# Patient Record
Sex: FEMALE | Race: WHITE | NOT HISPANIC OR LATINO | Employment: UNEMPLOYED | URBAN - NONMETROPOLITAN AREA
[De-identification: names, ages, dates, MRNs, and addresses within clinical notes are randomized per-mention and may not be internally consistent; named-entity substitution may affect disease eponyms.]

---

## 2023-03-20 ENCOUNTER — TELEPHONE (OUTPATIENT)
Dept: PRIMARY CARE | Facility: CLINIC | Age: 33
End: 2023-03-20

## 2023-03-20 DIAGNOSIS — E10.9 TYPE 1 DIABETES MELLITUS WITHOUT COMPLICATION (MULTI): ICD-10-CM

## 2023-03-20 RX ORDER — INSULIN GLARGINE 100 [IU]/ML
28 INJECTION, SOLUTION SUBCUTANEOUS 2 TIMES DAILY
Qty: 30 ML | Refills: 3 | Status: SHIPPED | OUTPATIENT
Start: 2023-03-20 | End: 2023-07-10 | Stop reason: SDUPTHER

## 2023-03-20 RX ORDER — INSULIN GLARGINE 100 [IU]/ML
28 INJECTION, SOLUTION SUBCUTANEOUS 2 TIMES DAILY
COMMUNITY
End: 2023-03-20 | Stop reason: SDUPTHER

## 2023-03-20 NOTE — TELEPHONE ENCOUNTER
Insurance messed up patient's insulin dosage. Patient is leaving for the weekend so needs it corrected. Asking for a call back.

## 2023-05-11 ENCOUNTER — TELEPHONE (OUTPATIENT)
Dept: PRIMARY CARE | Facility: CLINIC | Age: 33
End: 2023-05-11
Payer: MEDICAID

## 2023-05-11 DIAGNOSIS — K21.9 GASTROESOPHAGEAL REFLUX DISEASE WITHOUT ESOPHAGITIS: Primary | ICD-10-CM

## 2023-05-11 RX ORDER — PANTOPRAZOLE SODIUM 40 MG/1
40 TABLET, DELAYED RELEASE ORAL DAILY
Qty: 30 TABLET | Refills: 11 | Status: SHIPPED | OUTPATIENT
Start: 2023-05-11 | End: 2024-05-10

## 2023-05-11 NOTE — TELEPHONE ENCOUNTER
Was taking medication for acid reflux, but it was giving her constipation. She stopped taking it for 2 days and was better. Asking if she should continue taking it. Asking for a call back.

## 2023-07-06 ENCOUNTER — TELEPHONE (OUTPATIENT)
Dept: PRIMARY CARE | Facility: CLINIC | Age: 33
End: 2023-07-06
Payer: MEDICAID

## 2023-07-06 DIAGNOSIS — J30.89 PERENNIAL ALLERGIC RHINITIS: ICD-10-CM

## 2023-07-06 DIAGNOSIS — J45.909 EXTRINSIC ASTHMA, UNSPECIFIED ASTHMA SEVERITY, UNSPECIFIED WHETHER COMPLICATED, UNSPECIFIED WHETHER PERSISTENT (HHS-HCC): ICD-10-CM

## 2023-07-06 PROBLEM — M25.562 LEFT KNEE PAIN: Status: ACTIVE | Noted: 2023-07-06

## 2023-07-06 PROBLEM — K03.6 DENTAL PLAQUE: Status: ACTIVE | Noted: 2023-07-06

## 2023-07-06 PROBLEM — M24.462: Status: ACTIVE | Noted: 2023-07-06

## 2023-07-06 PROBLEM — M72.2 PLANTAR FASCIITIS: Status: ACTIVE | Noted: 2023-07-06

## 2023-07-06 PROBLEM — I10 HTN (HYPERTENSION): Status: ACTIVE | Noted: 2023-07-06

## 2023-07-06 PROBLEM — E04.1 THYROID NODULE: Status: ACTIVE | Noted: 2023-07-06

## 2023-07-06 PROBLEM — M25.531 RIGHT WRIST PAIN: Status: ACTIVE | Noted: 2023-07-06

## 2023-07-06 PROBLEM — M22.2X2 PATELLOFEMORAL PAIN SYNDROME OF LEFT KNEE: Status: ACTIVE | Noted: 2023-07-06

## 2023-07-06 PROBLEM — S80.02XA CONTUSION OF LEFT KNEE: Status: ACTIVE | Noted: 2023-07-06

## 2023-07-06 PROBLEM — J34.89 SINUS DRAINAGE: Status: ACTIVE | Noted: 2023-07-06

## 2023-07-06 PROBLEM — M22.42 CHONDROMALACIA OF LEFT PATELLA: Status: ACTIVE | Noted: 2023-07-06

## 2023-07-06 PROBLEM — E10.65 TYPE 1 DIABETES MELLITUS WITH HYPERGLYCEMIA (MULTI): Status: ACTIVE | Noted: 2023-07-06

## 2023-07-06 RX ORDER — MONTELUKAST SODIUM 10 MG/1
1 TABLET, FILM COATED ORAL DAILY
COMMUNITY
Start: 2020-03-17 | End: 2023-07-06 | Stop reason: SDUPTHER

## 2023-07-06 RX ORDER — MONTELUKAST SODIUM 10 MG/1
10 TABLET, FILM COATED ORAL DAILY
Qty: 30 TABLET | Refills: 11 | Status: SHIPPED | OUTPATIENT
Start: 2023-07-06 | End: 2023-07-11

## 2023-07-06 NOTE — TELEPHONE ENCOUNTER
Stating the RX of singulair has 10 refills left, but the pharmacy says she needs a doctor approval to refill it. Asking for a call back.

## 2023-07-08 LAB
ALANINE AMINOTRANSFERASE (SGPT) (U/L) IN SER/PLAS: 10 U/L (ref 7–45)
ALBUMIN (G/DL) IN SER/PLAS: 3.6 G/DL (ref 3.4–5)
ALKALINE PHOSPHATASE (U/L) IN SER/PLAS: 75 U/L (ref 33–110)
ANION GAP IN SER/PLAS: 11 MMOL/L (ref 10–20)
ASPARTATE AMINOTRANSFERASE (SGOT) (U/L) IN SER/PLAS: 9 U/L (ref 9–39)
BASOPHILS (10*3/UL) IN BLOOD BY AUTOMATED COUNT: 0.04 X10E9/L (ref 0–0.1)
BASOPHILS/100 LEUKOCYTES IN BLOOD BY AUTOMATED COUNT: 0.5 % (ref 0–2)
BILIRUBIN TOTAL (MG/DL) IN SER/PLAS: 0.6 MG/DL (ref 0–1.2)
CALCIUM (MG/DL) IN SER/PLAS: 8.8 MG/DL (ref 8.6–10.3)
CARBON DIOXIDE, TOTAL (MMOL/L) IN SER/PLAS: 28 MMOL/L (ref 21–32)
CHLORIDE (MMOL/L) IN SER/PLAS: 103 MMOL/L (ref 98–107)
CREATININE (MG/DL) IN SER/PLAS: 0.7 MG/DL (ref 0.5–1.05)
EOSINOPHILS (10*3/UL) IN BLOOD BY AUTOMATED COUNT: 0.27 X10E9/L (ref 0–0.7)
EOSINOPHILS/100 LEUKOCYTES IN BLOOD BY AUTOMATED COUNT: 3.1 % (ref 0–6)
ERYTHROCYTE DISTRIBUTION WIDTH (RATIO) BY AUTOMATED COUNT: 13.5 % (ref 11.5–14.5)
ERYTHROCYTE MEAN CORPUSCULAR HEMOGLOBIN CONCENTRATION (G/DL) BY AUTOMATED: 32.3 G/DL (ref 32–36)
ERYTHROCYTE MEAN CORPUSCULAR VOLUME (FL) BY AUTOMATED COUNT: 84 FL (ref 80–100)
ERYTHROCYTES (10*6/UL) IN BLOOD BY AUTOMATED COUNT: 5.22 X10E12/L (ref 4–5.2)
ESTIMATED AVERAGE GLUCOSE FOR HBA1C: 235 MG/DL
GFR FEMALE: >90 ML/MIN/1.73M2
GLUCOSE (MG/DL) IN SER/PLAS: 285 MG/DL (ref 74–99)
HEMATOCRIT (%) IN BLOOD BY AUTOMATED COUNT: 43.7 % (ref 36–46)
HEMOGLOBIN (G/DL) IN BLOOD: 14.1 G/DL (ref 12–16)
HEMOGLOBIN A1C/HEMOGLOBIN TOTAL IN BLOOD: 9.8 %
IMMATURE GRANULOCYTES/100 LEUKOCYTES IN BLOOD BY AUTOMATED COUNT: 0.2 % (ref 0–0.9)
LEUKOCYTES (10*3/UL) IN BLOOD BY AUTOMATED COUNT: 8.8 X10E9/L (ref 4.4–11.3)
LYMPHOCYTES (10*3/UL) IN BLOOD BY AUTOMATED COUNT: 2.54 X10E9/L (ref 1.2–4.8)
LYMPHOCYTES/100 LEUKOCYTES IN BLOOD BY AUTOMATED COUNT: 28.8 % (ref 13–44)
MONOCYTES (10*3/UL) IN BLOOD BY AUTOMATED COUNT: 0.59 X10E9/L (ref 0.1–1)
MONOCYTES/100 LEUKOCYTES IN BLOOD BY AUTOMATED COUNT: 6.7 % (ref 2–10)
NEUTROPHILS (10*3/UL) IN BLOOD BY AUTOMATED COUNT: 5.37 X10E9/L (ref 1.2–7.7)
NEUTROPHILS/100 LEUKOCYTES IN BLOOD BY AUTOMATED COUNT: 60.7 % (ref 40–80)
PLATELETS (10*3/UL) IN BLOOD AUTOMATED COUNT: 295 X10E9/L (ref 150–450)
POTASSIUM (MMOL/L) IN SER/PLAS: 4.5 MMOL/L (ref 3.5–5.3)
PROTEIN TOTAL: 6.4 G/DL (ref 6.4–8.2)
SODIUM (MMOL/L) IN SER/PLAS: 137 MMOL/L (ref 136–145)
UREA NITROGEN (MG/DL) IN SER/PLAS: 12 MG/DL (ref 6–23)

## 2023-07-10 ENCOUNTER — OFFICE VISIT (OUTPATIENT)
Dept: PRIMARY CARE | Facility: CLINIC | Age: 33
End: 2023-07-10
Payer: MEDICAID

## 2023-07-10 VITALS
WEIGHT: 184 LBS | HEART RATE: 100 BPM | SYSTOLIC BLOOD PRESSURE: 134 MMHG | HEIGHT: 66 IN | DIASTOLIC BLOOD PRESSURE: 84 MMHG | OXYGEN SATURATION: 97 % | BODY MASS INDEX: 29.57 KG/M2

## 2023-07-10 DIAGNOSIS — E10.9 TYPE 1 DIABETES MELLITUS WITHOUT COMPLICATION (MULTI): ICD-10-CM

## 2023-07-10 DIAGNOSIS — E10.65 TYPE 1 DIABETES MELLITUS WITH HYPERGLYCEMIA (MULTI): Primary | ICD-10-CM

## 2023-07-10 DIAGNOSIS — M25.562 LEFT KNEE PAIN, UNSPECIFIED CHRONICITY: ICD-10-CM

## 2023-07-10 DIAGNOSIS — M72.2 PLANTAR FASCIITIS: ICD-10-CM

## 2023-07-10 DIAGNOSIS — M24.462 RECURRENT DISLOCATION OF LEFT KNEE: ICD-10-CM

## 2023-07-10 DIAGNOSIS — M79.672 LEFT FOOT PAIN: ICD-10-CM

## 2023-07-10 DIAGNOSIS — K03.6 DENTAL PLAQUE: ICD-10-CM

## 2023-07-10 DIAGNOSIS — I10 PRIMARY HYPERTENSION: ICD-10-CM

## 2023-07-10 DIAGNOSIS — K21.9 GASTROESOPHAGEAL REFLUX DISEASE WITHOUT ESOPHAGITIS: ICD-10-CM

## 2023-07-10 DIAGNOSIS — E04.1 THYROID NODULE: ICD-10-CM

## 2023-07-10 DIAGNOSIS — J30.89 PERENNIAL ALLERGIC RHINITIS: ICD-10-CM

## 2023-07-10 DIAGNOSIS — J45.40 MODERATE PERSISTENT EXTRINSIC ASTHMA WITHOUT COMPLICATION (HHS-HCC): ICD-10-CM

## 2023-07-10 PROBLEM — L03.031 PARONYCHIA OF GREAT TOE OF RIGHT FOOT: Status: RESOLVED | Noted: 2023-07-10 | Resolved: 2023-07-10

## 2023-07-10 PROBLEM — M22.2X2 PATELLOFEMORAL PAIN SYNDROME OF LEFT KNEE: Status: RESOLVED | Noted: 2023-07-06 | Resolved: 2023-07-10

## 2023-07-10 PROBLEM — M20.42 ACQUIRED HAMMER TOE OF LEFT FOOT: Status: ACTIVE | Noted: 2023-07-10

## 2023-07-10 PROBLEM — M22.42 CHONDROMALACIA OF LEFT PATELLA: Status: RESOLVED | Noted: 2023-07-06 | Resolved: 2023-07-10

## 2023-07-10 PROBLEM — E11.40 DIABETIC NEUROPATHY (MULTI): Status: RESOLVED | Noted: 2023-07-10 | Resolved: 2023-07-10

## 2023-07-10 PROBLEM — L30.1 DYSHYDROSIS: Status: ACTIVE | Noted: 2023-07-10

## 2023-07-10 PROBLEM — S80.02XA CONTUSION OF LEFT KNEE: Status: RESOLVED | Noted: 2023-07-06 | Resolved: 2023-07-10

## 2023-07-10 PROBLEM — H57.9 EYE PROBLEM: Status: RESOLVED | Noted: 2023-07-10 | Resolved: 2023-07-10

## 2023-07-10 PROBLEM — L03.031 PARONYCHIA OF GREAT TOE OF RIGHT FOOT: Status: ACTIVE | Noted: 2023-07-10

## 2023-07-10 PROBLEM — J34.89 SINUS DRAINAGE: Status: RESOLVED | Noted: 2023-07-06 | Resolved: 2023-07-10

## 2023-07-10 PROBLEM — E11.40 DIABETIC NEUROPATHY (MULTI): Status: ACTIVE | Noted: 2023-07-10

## 2023-07-10 PROBLEM — H57.9 EYE PROBLEM: Status: ACTIVE | Noted: 2023-07-10

## 2023-07-10 PROBLEM — L30.1 DYSHYDROSIS: Status: RESOLVED | Noted: 2023-07-10 | Resolved: 2023-07-10

## 2023-07-10 PROCEDURE — 3046F HEMOGLOBIN A1C LEVEL >9.0%: CPT | Performed by: FAMILY MEDICINE

## 2023-07-10 PROCEDURE — 3075F SYST BP GE 130 - 139MM HG: CPT | Performed by: FAMILY MEDICINE

## 2023-07-10 PROCEDURE — 3079F DIAST BP 80-89 MM HG: CPT | Performed by: FAMILY MEDICINE

## 2023-07-10 PROCEDURE — 1036F TOBACCO NON-USER: CPT | Performed by: FAMILY MEDICINE

## 2023-07-10 PROCEDURE — 99214 OFFICE O/P EST MOD 30 MIN: CPT | Performed by: FAMILY MEDICINE

## 2023-07-10 RX ORDER — ESOMEPRAZOLE MAGNESIUM 40 MG/1
40 CAPSULE, DELAYED RELEASE ORAL DAILY
COMMUNITY
Start: 2023-05-29

## 2023-07-10 RX ORDER — BLOOD-GLUCOSE METER
4 EACH MISCELLANEOUS DAILY
COMMUNITY

## 2023-07-10 RX ORDER — DICLOFENAC SODIUM 10 MG/G
4 GEL TOPICAL 3 TIMES DAILY PRN
COMMUNITY
Start: 2022-07-24 | End: 2023-07-10 | Stop reason: SDUPTHER

## 2023-07-10 RX ORDER — CETIRIZINE HYDROCHLORIDE AND PSEUDOEPHEDRINE HYDROCHLORIDE 5; 120 MG/1; MG/1
1 TABLET, FILM COATED, EXTENDED RELEASE ORAL NIGHTLY
COMMUNITY
Start: 2023-06-24 | End: 2023-07-10 | Stop reason: SDUPTHER

## 2023-07-10 RX ORDER — DICLOFENAC SODIUM 10 MG/G
4 GEL TOPICAL 3 TIMES DAILY PRN
Qty: 100 G | Refills: 5 | Status: SHIPPED | OUTPATIENT
Start: 2023-07-10

## 2023-07-10 RX ORDER — UREA 200 MG/G
1 CREAM TOPICAL NIGHTLY
COMMUNITY
Start: 2023-06-26

## 2023-07-10 RX ORDER — ALBUTEROL SULFATE 90 UG/1
2 AEROSOL, METERED RESPIRATORY (INHALATION) EVERY 4 HOURS PRN
COMMUNITY
Start: 2022-03-27 | End: 2023-11-06 | Stop reason: ALTCHOICE

## 2023-07-10 RX ORDER — INSULIN LISPRO 100 [IU]/ML
INJECTION, SOLUTION INTRAVENOUS; SUBCUTANEOUS
COMMUNITY
Start: 2022-08-12

## 2023-07-10 RX ORDER — CETIRIZINE HYDROCHLORIDE AND PSEUDOEPHEDRINE HYDROCHLORIDE 5; 120 MG/1; MG/1
1 TABLET, FILM COATED, EXTENDED RELEASE ORAL 2 TIMES DAILY
Qty: 60 TABLET | Refills: 11 | Status: SHIPPED | OUTPATIENT
Start: 2023-07-10 | End: 2024-07-09

## 2023-07-10 RX ORDER — PEN NEEDLE, DIABETIC 31 GX3/16"
1 NEEDLE, DISPOSABLE MISCELLANEOUS 2 TIMES DAILY
COMMUNITY
Start: 2023-06-03

## 2023-07-10 RX ORDER — ALBUTEROL SULFATE 0.83 MG/ML
2.5 SOLUTION RESPIRATORY (INHALATION) EVERY 4 HOURS PRN
COMMUNITY

## 2023-07-10 RX ORDER — INSULIN GLARGINE 100 [IU]/ML
26 INJECTION, SOLUTION SUBCUTANEOUS 2 TIMES DAILY
Qty: 30 ML | Refills: 5 | Status: SHIPPED | OUTPATIENT
Start: 2023-07-10

## 2023-07-10 RX ORDER — ONDANSETRON 4 MG/1
4 TABLET, ORALLY DISINTEGRATING ORAL EVERY 8 HOURS PRN
COMMUNITY
Start: 2020-06-07

## 2023-07-10 ASSESSMENT — PATIENT HEALTH QUESTIONNAIRE - PHQ9
2. FEELING DOWN, DEPRESSED OR HOPELESS: NOT AT ALL
1. LITTLE INTEREST OR PLEASURE IN DOING THINGS: NOT AT ALL
SUM OF ALL RESPONSES TO PHQ9 QUESTIONS 1 AND 2: 0

## 2023-07-10 NOTE — PROGRESS NOTES
"Subjective   Patient ID: Rosendo Fung is a 33 y.o. female who presents for Med Management.    HPI   Diabetes has been running mid to upper 100s. A1C 9.8% with  on labs.   Similar to what she had in the past. Felt OK. Lantus 26 bid. (or Basaglar).  Humalog SS 6-8 with average 27 units per day. She has had a few low sugars. So same dilemma we have had for years with her numbers being much better then we get. But also getting symptomatic low sugars. Rest of CMP good      Asthma and allergies - meds work well without side effects. Weather makes works. More rescue lately 3-4  per week.  Zyrtec D at hs. Would like in the morning as well. She cannot get Singulair which has helped and rarely used the inhaler.      Dental plaque - had cleaning in December.      GERD - meds work well without side effects. No HB, Melena, dysphagia, or hematochezia. Able to eat tomatoes      Hammertoes of second toes bilaterally. Do no go flat. No really bent. No pain     HTN - when off of medication has been good and not having headaches. No Chest pain, Dyspnea, palpitations, numbness, weakness, edema, claudication, or double vision/ loss of vision.     Knee dislocation at times. Less often      Plantar fasciitis - orthotics from Dr Giordano. Better. Does have knee and ankle pain treated with Voltaren as needed.      Thyroid nodule - no change and good TSH in January     Review of Systems    Objective   /84 (BP Location: Left arm, Patient Position: Sitting)   Pulse 100   Ht 1.664 m (5' 5.5\")   Wt 83.5 kg (184 lb)   SpO2 97%   BMI 30.15 kg/m²     Physical Exam  Vitals reviewed.   Constitutional:       General: She is not in acute distress.     Appearance: Normal appearance.   HENT:      Head: Normocephalic.      Right Ear: Tympanic membrane normal.      Left Ear: Tympanic membrane normal.      Nose: Nose normal.      Mouth/Throat:      Pharynx: Oropharynx is clear.   Eyes:      Extraocular Movements: Extraocular movements " intact.      Conjunctiva/sclera: Conjunctivae normal.      Pupils: Pupils are equal, round, and reactive to light.   Neck:      Thyroid: Thyromegaly (multinodular and quite large) present.      Vascular: No carotid bruit.   Cardiovascular:      Rate and Rhythm: Normal rate and regular rhythm.      Pulses: Normal pulses.      Heart sounds: Normal heart sounds. No murmur heard.  Pulmonary:      Effort: Pulmonary effort is normal. No respiratory distress.      Breath sounds: Normal breath sounds.   Abdominal:      General: Abdomen is flat. Bowel sounds are normal. There is no distension.      Palpations: Abdomen is soft. There is no mass.      Tenderness: There is no abdominal tenderness.   Musculoskeletal:      Cervical back: Normal range of motion and neck supple. No tenderness.   Lymphadenopathy:      Cervical: No cervical adenopathy.   Skin:     General: Skin is warm and dry.      Findings: No rash.   Neurological:      General: No focal deficit present.      Mental Status: She is alert and oriented to person, place, and time.   Psychiatric:         Mood and Affect: Mood normal.         Thought Content: Thought content normal.         Judgment: Judgment normal.         Assessment/Plan   Problem List Items Addressed This Visit       Allergic asthma    Dental plaque    Diabetes mellitus type 1 (CMS/HCC)    Relevant Medications    insulin glargine (Lantus Solostar U-100 Insulin) 100 unit/mL (3 mL) pen    Other Relevant Orders    Comprehensive Metabolic Panel    Lipid Panel    Albumin , Urine Random    CBC and Auto Differential    Hemoglobin A1C    Follow Up In Primary Care - Established    GERD (gastroesophageal reflux disease)    HTN (hypertension)    Left foot pain    Relevant Medications    diclofenac sodium (Voltaren) 1 % gel gel    Left knee pain    Relevant Medications    diclofenac sodium (Voltaren) 1 % gel gel    Perennial allergic rhinitis    Relevant Medications    ZyrTEC-D 5-120 mg 12 hr tablet    Plantar  fasciitis    Recurrent dislocation of left knee    Thyroid nodule    Relevant Orders    TSH with reflex to Free T4 if abnormal    Type 1 diabetes mellitus with hyperglycemia (CMS/HCC) - Primary    Relevant Orders    Comprehensive Metabolic Panel    Lipid Panel    Albumin , Urine Random    CBC and Auto Differential    Hemoglobin A1C    Follow Up In Primary Care - Established

## 2023-07-11 ENCOUNTER — TELEPHONE (OUTPATIENT)
Dept: PRIMARY CARE | Facility: CLINIC | Age: 33
End: 2023-07-11
Payer: MEDICAID

## 2023-07-11 DIAGNOSIS — J30.89 PERENNIAL ALLERGIC RHINITIS: ICD-10-CM

## 2023-07-11 RX ORDER — CETIRIZINE HYDROCHLORIDE, PSEUDOEPHEDRINE HYDROCHLORIDE 5; 120 MG/1; MG/1
1 TABLET, FILM COATED, EXTENDED RELEASE ORAL 2 TIMES DAILY
Qty: 60 TABLET | Refills: 11 | OUTPATIENT
Start: 2023-07-11

## 2023-07-11 RX ORDER — CETIRIZINE HYDROCHLORIDE, PSEUDOEPHEDRINE HYDROCHLORIDE 5; 120 MG/1; MG/1
1 TABLET, FILM COATED, EXTENDED RELEASE ORAL 2 TIMES DAILY
COMMUNITY
End: 2023-11-06 | Stop reason: ALTCHOICE

## 2023-07-11 NOTE — TELEPHONE ENCOUNTER
Stating she still can't get the approval for the Singulair but was told she can get the zyrtec 12 hour. Asking for a call back.

## 2023-07-12 NOTE — TELEPHONE ENCOUNTER
Pc to pt. She said that the pharmacy does have the Zyrtec D Rx, but it's too soon to fill since she got a Rx last week.  She is concerned because she is taking 2 daily now.  I told her to finish what she has, she will run out sooner, but they should be able to get the Rx for bid.  And she stated you told her she can take the regular Zyrtec in addition to the Zyrtec D since she has allergic asthma as well.   If ok, can you send that to [facility]

## 2023-09-26 PROBLEM — L72.3 SEBACEOUS CYST: Status: ACTIVE | Noted: 2023-09-26

## 2023-09-26 RX ORDER — MONTELUKAST SODIUM 10 MG/1
1 TABLET ORAL DAILY
COMMUNITY
End: 2023-11-06 | Stop reason: ALTCHOICE

## 2023-09-26 RX ORDER — CHOLECALCIFEROL (VITAMIN D3) 125 MCG
CAPSULE ORAL
COMMUNITY

## 2023-10-02 ENCOUNTER — OFFICE VISIT (OUTPATIENT)
Dept: ORTHOPEDIC SURGERY | Facility: CLINIC | Age: 33
End: 2023-10-02
Payer: MEDICAID

## 2023-10-02 DIAGNOSIS — M22.2X2 PATELLOFEMORAL DISORDER OF LEFT KNEE: ICD-10-CM

## 2023-10-02 DIAGNOSIS — M22.42 CHONDROMALACIA OF LEFT PATELLA: Primary | ICD-10-CM

## 2023-10-02 PROBLEM — M94.262 CHONDROMALACIA OF LEFT KNEE: Status: ACTIVE | Noted: 2023-07-06

## 2023-10-02 PROCEDURE — 1036F TOBACCO NON-USER: CPT | Performed by: NURSE PRACTITIONER

## 2023-10-02 PROCEDURE — 3046F HEMOGLOBIN A1C LEVEL >9.0%: CPT | Performed by: NURSE PRACTITIONER

## 2023-10-02 PROCEDURE — 99213 OFFICE O/P EST LOW 20 MIN: CPT | Performed by: NURSE PRACTITIONER

## 2023-10-02 RX ORDER — ASPIRIN 81 MG/1
TABLET ORAL ONCE
Status: CANCELLED | OUTPATIENT
Start: 2023-10-02 | End: 2023-10-02

## 2023-10-02 ASSESSMENT — PAIN SCALES - GENERAL: PAINLEVEL_OUTOF10: 8

## 2023-10-02 ASSESSMENT — PAIN DESCRIPTION - DESCRIPTORS: DESCRIPTORS: ACHING

## 2023-10-02 ASSESSMENT — PAIN - FUNCTIONAL ASSESSMENT: PAIN_FUNCTIONAL_ASSESSMENT: 0-10

## 2023-10-02 ASSESSMENT — ENCOUNTER SYMPTOMS
NUMBNESS: 0
KNEE DEFORMITY: 1
INABILITY TO BEAR WEIGHT: 0
MUSCLE WEAKNESS: 0
LOSS OF MOTION: 0
LOSS OF SENSATION: 0
TINGLING: 0

## 2023-10-02 NOTE — PROGRESS NOTES
"Subjective    Patient ID: Rosendo Fung is a 33 y.o. female.    Chief Complaint: Pain and Dislocation of the Left Knee       Carmelita is a pleasant 33-year-old female here for evaluation of left knee pain.  Patient was last seen here in November 2022 for patellofemoral syndrome which improved with therapy and bracing.  Patient states she is experiencing multiple episodes of a popping sensation, this can occur with rest.  Unable to identify any aggravating factors.  Patient uses Aleve and topical Aspercreme with salicylate with some improvement.  Patient has 3 knee braces which she rotates depending on her activity which do seem to help.  Patient states she has no difficulty with steps or hills, kneeling, bending or twisting.  She did complete a 2 mile walk yesterday which did seem to aggravate symptoms.  Patient reports she gets pain to the lateral portion of her knee and describes medial subluxations.  Patient states she has to manually put pressure on her knee to \"pop it back in place.\"    Left Knee   Incident onset: Years. There was no injury mechanism. The pain is present in the left knee. The quality of the pain is described as aching and shooting. The pain has been Fluctuating since onset. Pertinent negatives include no inability to bear weight, loss of motion, loss of sensation, muscle weakness, numbness or tingling. She reports no foreign bodies present. Nothing aggravates the symptoms. She has tried rest and NSAIDs for the symptoms. The treatment provided mild relief.       Objective   Right Knee Exam   Right knee exam is normal.    Muscle Strength   The patient has normal right knee strength.      Left Knee Exam     Muscle Strength   The patient has normal left knee strength.    Tenderness   The patient is experiencing tenderness in the lateral joint line and medial joint line.    Range of Motion   The patient has normal left knee ROM.    Tests   Bobby:  Medial - negative Lateral - negative  Varus: " negative Valgus: negative  Lachman:      Posterior - negative  Drawer:  Anterior - negative     Posterior - negative  Patellar apprehension: negative    Other   Erythema: absent  Sensation: normal  Pulse: present  Swelling: mild    Comments:  Patient with increased pain with lateral greater than medial Bobby's, no laxity noted.  Patella stable on today's exam            Image Results:  Reviewed XR and MRI images from Sept 2022 DURING TODAY'S VISIT.  No acute tears, mild chondromalacia      Assessment/Plan   Encounter Diagnoses:    Chondromalacia of left patella [M22.42]             Orders Placed This Encounter   Procedures    Referral to Physical Therapy     Standing Status:   Future     Standing Expiration Date:   4/2/2024     Referral Priority:   Routine     Referral Type:   Consultation     Referral Reason:   Specialty Services Required     Requested Specialty:   Physical Therapy     Number of Visits Requested:   1          Knee Musculoskeletal Exam  Gait    Limp: left    Limp comment: L lower leg in lateral tibial torsion    Inspection    Leg length disparity: no discrepancy    Right      Right knee inspection is normal.      Left      Left knee inspection is normal.     Palpation    Right      Right knee palpation is unremarkable.      Left      Left knee palpation is unremarkable.      Range of Motion    Left      Left knee range of motion is normal.      Strength    Right      Right knee strength is normal.     Left      Left knee strength is normal.      Instability    Left      Varus stress grade: normal      Valgus stress grade: normal      Anterior drawer: negative      Posterior drawer: negative      Medial Bobby test: negative      Lateral Bobby test: negative    Neurovascular    Right      Dorsalis pedis: 2+    Left      Dorsalis pedis: 2+      Capillary refill: brisk    Special Signs    Left      Straight leg raise: normal      Patellar compression: mild        Patellar apprehension: mild       Patellofemoral disorder of left knee  Patient wishes to pursue PT at outside location.  Order was provided.  Plan will be to follow-up here in approximately 1 month, I did request updated notes to evaluate at next visit.  Patient instructed to wear the support of compression sleeve with weightbearing activities, off with rest and sleep.  Reviewed use of OTC Aleve and Aspercreme for symptom control.  We did discuss cortisone injection, however last documented hemoglobin A1c from July of this year was 9.8%.  She states she has an order for repeat.  We did discuss risk and benefits and we can either review this at the next visit or sooner if the A1c is 7.5% or below.  This note was generated using Dragon software.  It may contain errors in wording, punctuation or spelling.

## 2023-10-02 NOTE — LETTER
"October 6, 2023     Mike Miller MD  [address]  [facility], [place] [id number]    Patient: Rosendo Fung   YOB: 1990   Date of Visit: 10/2/2023       Dear Dr. Mike Miller MD:    Thank you for referring Rosendo Fung to me for evaluation. Below are my notes for this consultation.  If you have questions, please do not hesitate to call me. I look forward to following your patient along with you.       Sincerely,     Nisa Rangel, APRN-CNP      CC: No Recipients  ______________________________________________________________________________________    Subjective   Patient ID: Rosendo Fung is a 33 y.o. female.    Chief Complaint: Pain and Dislocation of the Left Knee       Carmelita is a pleasant 33-year-old female here for evaluation of left knee pain.  Patient was last seen here in November 2022 for patellofemoral syndrome which improved with therapy and bracing.  Patient states she is experiencing multiple episodes of a popping sensation, this can occur with rest.  Unable to identify any aggravating factors.  Patient uses Aleve and topical Aspercreme with salicylate with some improvement.  Patient has 3 knee braces which she rotates depending on her activity which do seem to help.  Patient states she has no difficulty with steps or hills, kneeling, bending or twisting.  She did complete a 2 mile walk yesterday which did seem to aggravate symptoms.  Patient reports she gets pain to the lateral portion of her knee and describes medial subluxations.  Patient states she has to manually put pressure on her knee to \"pop it back in place.\"    Left Knee   Incident onset: Years. There was no injury mechanism. The pain is present in the left knee. The quality of the pain is described as aching and shooting. The pain has been Fluctuating since onset. Pertinent negatives include no inability to bear weight, loss of motion, loss of sensation, muscle weakness, numbness " or tingling. She reports no foreign bodies present. Nothing aggravates the symptoms. She has tried rest and NSAIDs for the symptoms. The treatment provided mild relief.       Objective  Right Knee Exam   Right knee exam is normal.    Muscle Strength   The patient has normal right knee strength.      Left Knee Exam     Muscle Strength   The patient has normal left knee strength.    Tenderness   The patient is experiencing tenderness in the lateral joint line and medial joint line.    Range of Motion   The patient has normal left knee ROM.    Tests   Bobby:  Medial - negative Lateral - negative  Varus: negative Valgus: negative  Lachman:      Posterior - negative  Drawer:  Anterior - negative     Posterior - negative  Patellar apprehension: negative    Other   Erythema: absent  Sensation: normal  Pulse: present  Swelling: mild    Comments:  Patient with increased pain with lateral greater than medial Bobby's, no laxity noted.  Patella stable on today's exam            Image Results:  Reviewed XR and MRI images from Sept 2022 DURING TODAY'S VISIT.  No acute tears, mild chondromalacia      Assessment/Plan  Encounter Diagnoses:    Chondromalacia of left patella [M22.42]             Orders Placed This Encounter   Procedures   • Referral to Physical Therapy     Standing Status:   Future     Standing Expiration Date:   4/2/2024     Referral Priority:   Routine     Referral Type:   Consultation     Referral Reason:   Specialty Services Required     Requested Specialty:   Physical Therapy     Number of Visits Requested:   1          Knee Musculoskeletal Exam  Gait    Limp: left    Limp comment: L lower leg in lateral tibial torsion    Inspection    Leg length disparity: no discrepancy    Right      Right knee inspection is normal.      Left      Left knee inspection is normal.     Palpation    Right      Right knee palpation is unremarkable.      Left      Left knee palpation is unremarkable.      Range of Motion     Left      Left knee range of motion is normal.      Strength    Right      Right knee strength is normal.     Left      Left knee strength is normal.      Instability    Left      Varus stress grade: normal      Valgus stress grade: normal      Anterior drawer: negative      Posterior drawer: negative      Medial Bobby test: negative      Lateral Bobby test: negative    Neurovascular    Right      Dorsalis pedis: 2+    Left      Dorsalis pedis: 2+      Capillary refill: brisk    Special Signs    Left      Straight leg raise: normal      Patellar compression: mild        Patellar apprehension: mild      Patellofemoral disorder of left knee  Patient wishes to pursue PT at outside location.  Order was provided.  Plan will be to follow-up here in approximately 1 month, I did request updated notes to evaluate at next visit.  Patient instructed to wear the support of compression sleeve with weightbearing activities, off with rest and sleep.  Reviewed use of OTC Aleve and Aspercreme for symptom control.  We did discuss cortisone injection, however last documented hemoglobin A1c from July of this year was 9.8%.  She states she has an order for repeat.  We did discuss risk and benefits and we can either review this at the next visit or sooner if the A1c is 7.5% or below.  This note was generated using Dragon software.  It may contain errors in wording, punctuation or spelling.

## 2023-10-06 PROBLEM — M22.2X2 PATELLOFEMORAL DISORDER OF LEFT KNEE: Status: ACTIVE | Noted: 2023-10-06

## 2023-10-06 NOTE — ASSESSMENT & PLAN NOTE
Patient wishes to pursue PT at outside location.  Order was provided.  Plan will be to follow-up here in approximately 1 month, I did request updated notes to evaluate at next visit.  Patient instructed to wear the support of compression sleeve with weightbearing activities, off with rest and sleep.  Reviewed use of OTC Aleve and Aspercreme for symptom control.  We did discuss cortisone injection, however last documented hemoglobin A1c from July of this year was 9.8%.  She states she has an order for repeat.  We did discuss risk and benefits and we can either review this at the next visit or sooner if the A1c is 7.5% or below.  This note was generated using Dragon software.  It may contain errors in wording, punctuation or spelling.

## 2023-10-09 ENCOUNTER — APPOINTMENT (OUTPATIENT)
Dept: PRIMARY CARE | Facility: CLINIC | Age: 33
End: 2023-10-09
Payer: MEDICAID

## 2023-11-06 ENCOUNTER — OFFICE VISIT (OUTPATIENT)
Dept: ORTHOPEDIC SURGERY | Facility: CLINIC | Age: 33
End: 2023-11-06
Payer: MEDICAID

## 2023-11-06 DIAGNOSIS — M22.2X2 PATELLOFEMORAL DISORDER OF LEFT KNEE: Primary | ICD-10-CM

## 2023-11-06 PROCEDURE — 1036F TOBACCO NON-USER: CPT | Performed by: NURSE PRACTITIONER

## 2023-11-06 PROCEDURE — 3075F SYST BP GE 130 - 139MM HG: CPT | Performed by: NURSE PRACTITIONER

## 2023-11-06 PROCEDURE — 99213 OFFICE O/P EST LOW 20 MIN: CPT | Performed by: NURSE PRACTITIONER

## 2023-11-06 PROCEDURE — 3046F HEMOGLOBIN A1C LEVEL >9.0%: CPT | Performed by: NURSE PRACTITIONER

## 2023-11-06 PROCEDURE — 3079F DIAST BP 80-89 MM HG: CPT | Performed by: NURSE PRACTITIONER

## 2023-11-06 RX ORDER — ONDANSETRON 4 MG/1
4 TABLET, FILM COATED ORAL EVERY 8 HOURS PRN
COMMUNITY
Start: 2023-07-27

## 2023-11-06 ASSESSMENT — PAIN SCALES - GENERAL: PAINLEVEL_OUTOF10: 2

## 2023-11-06 ASSESSMENT — ENCOUNTER SYMPTOMS
MUSCULOSKELETAL NEGATIVE: 1
NEUROLOGICAL NEGATIVE: 1
NUMBNESS: 0
PSYCHIATRIC NEGATIVE: 1
CONSTITUTIONAL NEGATIVE: 1
ENDOCRINE NEGATIVE: 1
RESPIRATORY NEGATIVE: 1
CARDIOVASCULAR NEGATIVE: 1
HEMATOLOGIC/LYMPHATIC NEGATIVE: 1

## 2023-11-06 ASSESSMENT — PAIN DESCRIPTION - DESCRIPTORS: DESCRIPTORS: DULL

## 2023-11-06 ASSESSMENT — PAIN - FUNCTIONAL ASSESSMENT: PAIN_FUNCTIONAL_ASSESSMENT: 0-10

## 2023-11-06 NOTE — PROGRESS NOTES
Subjective    Patient ID: Rosendo Fung is a 33 y.o. female.    Chief Complaint: Follow-up of the Left Knee (Patient has been working with therapy and reports her pain is better.)     HPI:  Carmelita is a pleasant 33-year-old female here for one month follow-up of left knee pain and diagnosed patellofemoral syndrome.  Patient has been attending rehab and feels she has made excellent progress.  Patient states she has full range of motion, strength and has weaned out of the brace over the last week.  She has not needed to take oral NSAIDs for her symptoms.  Patient states she has 5 PT sessions remaining and is inquiring if she needs to complete them as scheduled. No new injuries.  Also started new job and states she is tolerating well.    Review of Systems   Constitutional: Negative.    HENT: Negative.     Respiratory: Negative.     Cardiovascular: Negative.    Endocrine: Negative.    Musculoskeletal: Negative.    Skin: Negative.    Neurological: Negative.  Negative for numbness.   Hematological: Negative.    Psychiatric/Behavioral: Negative.          Objective     Knee Musculoskeletal Exam  Gait    Gait is normal.    Inspection    Left      Left knee inspection is normal.       Erythema: none        Edema: none        Ecchymosis: none        Deformity: none        Alignment: normal      Palpation    Left      Left knee palpation is unremarkable.      Range of Motion    Left      Left knee range of motion is normal and full.      Strength    Left      Extension: 5/5.       Flexion: 5/5.      Instability    Left      Instability signs: none - stable    Neurovascular    Left      Left knee neurovascular exam is normal.      Special Signs    Left      Left knee special signs are normal.      Reviewed PT note form 11/1/23 during today's visit, good strength and ROM noted.     Assessment/Plan   Encounter Diagnoses:    Patellofemoral disorder of left knee  Patient wishes to pursue PT at outside location.  Order was  provided.  Plan will be to follow-up here in approximately 1 month, I did request updated notes to evaluate at next visit.  Patient instructed to wear the support of compression sleeve with weightbearing activities, off with rest and sleep.  Reviewed use of OTC Aleve and Aspercreme for symptom control.  We did discuss cortisone injection, however last documented hemoglobin A1c from July of this year was 9.8%.  She states she has an order for repeat.  We did discuss risk and benefits and we can either review this at the next visit or sooner if the A1c is 7.5% or below.  This note was generated using Dragon software.  It may contain errors in wording, punctuation or spelling.

## 2024-12-02 ENCOUNTER — HOSPITAL ENCOUNTER (OUTPATIENT)
Dept: RADIOLOGY | Facility: HOSPITAL | Age: 34
Discharge: HOME | End: 2024-12-02
Payer: MEDICAID

## 2024-12-02 VITALS — HEART RATE: 96 BPM | DIASTOLIC BLOOD PRESSURE: 95 MMHG | SYSTOLIC BLOOD PRESSURE: 170 MMHG | OXYGEN SATURATION: 97 %

## 2024-12-02 DIAGNOSIS — E04.1 NONTOXIC SINGLE THYROID NODULE: ICD-10-CM

## 2024-12-02 PROCEDURE — 88112 CYTOPATH CELL ENHANCE TECH: CPT | Mod: TC | Performed by: RADIOLOGY

## 2024-12-02 PROCEDURE — 2500000004 HC RX 250 GENERAL PHARMACY W/ HCPCS (ALT 636 FOR OP/ED): Performed by: RADIOLOGY

## 2024-12-02 PROCEDURE — 76942 ECHO GUIDE FOR BIOPSY: CPT

## 2024-12-02 RX ORDER — LIDOCAINE HYDROCHLORIDE 20 MG/ML
INJECTION, SOLUTION EPIDURAL; INFILTRATION; INTRACAUDAL; PERINEURAL
Status: COMPLETED | OUTPATIENT
Start: 2024-12-02 | End: 2024-12-02

## 2024-12-02 ASSESSMENT — PAIN SCALES - GENERAL: PAINLEVEL_OUTOF10: 0 - NO PAIN

## 2024-12-04 LAB
LABORATORY COMMENT REPORT: NORMAL
LABORATORY COMMENT REPORT: NORMAL
PATH REPORT.FINAL DX SPEC: NORMAL
PATH REPORT.GROSS SPEC: NORMAL
PATH REPORT.RELEVANT HX SPEC: NORMAL
PATH REPORT.TOTAL CANCER: NORMAL